# Patient Record
Sex: FEMALE | Race: WHITE | NOT HISPANIC OR LATINO | URBAN - METROPOLITAN AREA
[De-identification: names, ages, dates, MRNs, and addresses within clinical notes are randomized per-mention and may not be internally consistent; named-entity substitution may affect disease eponyms.]

---

## 2017-02-03 ENCOUNTER — OUTPATIENT (OUTPATIENT)
Dept: OUTPATIENT SERVICES | Facility: HOSPITAL | Age: 72
LOS: 1 days | End: 2017-02-03
Payer: MEDICARE

## 2017-02-03 VITALS
HEART RATE: 81 BPM | TEMPERATURE: 98 F | OXYGEN SATURATION: 95 % | HEIGHT: 60 IN | RESPIRATION RATE: 15 BRPM | DIASTOLIC BLOOD PRESSURE: 86 MMHG | WEIGHT: 265 LBS | SYSTOLIC BLOOD PRESSURE: 143 MMHG

## 2017-02-03 DIAGNOSIS — Z01.818 ENCOUNTER FOR OTHER PREPROCEDURAL EXAMINATION: ICD-10-CM

## 2017-02-03 DIAGNOSIS — M66.88 SPONTANEOUS RUPTURE OF OTHER TENDONS, OTHER SITES: ICD-10-CM

## 2017-02-03 DIAGNOSIS — Z90.89 ACQUIRED ABSENCE OF OTHER ORGANS: Chronic | ICD-10-CM

## 2017-02-03 DIAGNOSIS — R26.89 OTHER ABNORMALITIES OF GAIT AND MOBILITY: ICD-10-CM

## 2017-02-03 DIAGNOSIS — Z98.890 OTHER SPECIFIED POSTPROCEDURAL STATES: Chronic | ICD-10-CM

## 2017-02-03 DIAGNOSIS — Z84.89 FAMILY HISTORY OF OTHER SPECIFIED CONDITIONS: Chronic | ICD-10-CM

## 2017-02-03 DIAGNOSIS — Z99.81 DEPENDENCE ON SUPPLEMENTAL OXYGEN: ICD-10-CM

## 2017-02-03 DIAGNOSIS — S86.011A STRAIN OF RIGHT ACHILLES TENDON, INITIAL ENCOUNTER: ICD-10-CM

## 2017-02-03 DIAGNOSIS — Z90.710 ACQUIRED ABSENCE OF BOTH CERVIX AND UTERUS: Chronic | ICD-10-CM

## 2017-02-03 DIAGNOSIS — Z96.651 PRESENCE OF RIGHT ARTIFICIAL KNEE JOINT: Chronic | ICD-10-CM

## 2017-02-03 LAB
ALBUMIN SERPL ELPH-MCNC: 3.5 G/DL — SIGNIFICANT CHANGE UP (ref 3.3–5)
ALP SERPL-CCNC: 109 U/L — SIGNIFICANT CHANGE UP (ref 30–120)
ALT FLD-CCNC: 24 U/L DA — SIGNIFICANT CHANGE UP (ref 10–60)
ANION GAP SERPL CALC-SCNC: 6 MMOL/L — SIGNIFICANT CHANGE UP (ref 5–17)
AST SERPL-CCNC: 25 U/L — SIGNIFICANT CHANGE UP (ref 10–40)
BILIRUB SERPL-MCNC: 0.5 MG/DL — SIGNIFICANT CHANGE UP (ref 0.2–1.2)
BUN SERPL-MCNC: 21 MG/DL — SIGNIFICANT CHANGE UP (ref 7–23)
CALCIUM SERPL-MCNC: 10.2 MG/DL — SIGNIFICANT CHANGE UP (ref 8.4–10.5)
CHLORIDE SERPL-SCNC: 101 MMOL/L — SIGNIFICANT CHANGE UP (ref 96–108)
CO2 SERPL-SCNC: 35 MMOL/L — HIGH (ref 22–31)
CREAT SERPL-MCNC: 1.1 MG/DL — SIGNIFICANT CHANGE UP (ref 0.5–1.3)
GLUCOSE SERPL-MCNC: 165 MG/DL — HIGH (ref 70–99)
HCT VFR BLD CALC: 45.8 % — HIGH (ref 34.5–45)
HGB BLD-MCNC: 14.5 G/DL — SIGNIFICANT CHANGE UP (ref 11.5–15.5)
MCHC RBC-ENTMCNC: 27.4 PG — SIGNIFICANT CHANGE UP (ref 27–34)
MCHC RBC-ENTMCNC: 31.7 GM/DL — LOW (ref 32–36)
MCV RBC AUTO: 86.4 FL — SIGNIFICANT CHANGE UP (ref 80–100)
PLATELET # BLD AUTO: 265 K/UL — SIGNIFICANT CHANGE UP (ref 150–400)
POTASSIUM SERPL-MCNC: 3.9 MMOL/L — SIGNIFICANT CHANGE UP (ref 3.5–5.3)
POTASSIUM SERPL-SCNC: 3.9 MMOL/L — SIGNIFICANT CHANGE UP (ref 3.5–5.3)
PROT SERPL-MCNC: 7.7 G/DL — SIGNIFICANT CHANGE UP (ref 6–8.3)
RBC # BLD: 5.3 M/UL — HIGH (ref 3.8–5.2)
RBC # FLD: 19.6 % — HIGH (ref 10.3–14.5)
SODIUM SERPL-SCNC: 142 MMOL/L — SIGNIFICANT CHANGE UP (ref 135–145)
WBC # BLD: 13.7 K/UL — HIGH (ref 3.8–10.5)
WBC # FLD AUTO: 13.7 K/UL — HIGH (ref 3.8–10.5)

## 2017-02-03 PROCEDURE — 93010 ELECTROCARDIOGRAM REPORT: CPT | Mod: NC

## 2017-02-03 PROCEDURE — 93005 ELECTROCARDIOGRAM TRACING: CPT

## 2017-02-03 PROCEDURE — 80053 COMPREHEN METABOLIC PANEL: CPT

## 2017-02-03 PROCEDURE — 83036 HEMOGLOBIN GLYCOSYLATED A1C: CPT

## 2017-02-03 PROCEDURE — 85027 COMPLETE CBC AUTOMATED: CPT

## 2017-02-03 RX ORDER — METFORMIN HYDROCHLORIDE 850 MG/1
1 TABLET ORAL
Qty: 0 | Refills: 0 | COMMUNITY

## 2017-02-03 RX ORDER — ALLOPURINOL 300 MG
1 TABLET ORAL
Qty: 0 | Refills: 0 | COMMUNITY

## 2017-02-03 RX ORDER — IBUPROFEN 200 MG
1 TABLET ORAL
Qty: 0 | Refills: 0 | COMMUNITY

## 2017-02-03 RX ORDER — SITAGLIPTIN 50 MG/1
1 TABLET, FILM COATED ORAL
Qty: 0 | Refills: 0 | COMMUNITY

## 2017-02-03 RX ORDER — OMEPRAZOLE 10 MG/1
1 CAPSULE, DELAYED RELEASE ORAL
Qty: 0 | Refills: 0 | COMMUNITY

## 2017-02-03 RX ORDER — ATORVASTATIN CALCIUM 80 MG/1
1 TABLET, FILM COATED ORAL
Qty: 0 | Refills: 0 | COMMUNITY

## 2017-02-03 RX ORDER — METOPROLOL TARTRATE 50 MG
1 TABLET ORAL
Qty: 0 | Refills: 0 | COMMUNITY

## 2017-02-03 RX ORDER — ASPIRIN/CALCIUM CARB/MAGNESIUM 324 MG
1 TABLET ORAL
Qty: 0 | Refills: 0 | COMMUNITY

## 2017-02-03 RX ORDER — CHOLECALCIFEROL (VITAMIN D3) 125 MCG
1 CAPSULE ORAL
Qty: 0 | Refills: 0 | COMMUNITY

## 2017-02-03 NOTE — H&P PST ADULT - PMH
Achilles rupture, right    CAD (coronary artery disease)  nonobstructive  Dyslipidemia    Gait instability    GERD (gastroesophageal reflux disease)    Gout    History of cardiac catheterization  2016  History of CHF (congestive heart failure)    Inability to bear weight    Incontinence    Obesity, morbid, BMI 50 or higher    Oxygen dependent    Pulmonary hypertension  severe  Shortness of breath    Sleep apnea    Type 2 diabetes mellitus

## 2017-02-03 NOTE — H&P PST ADULT - FAMILY HISTORY
Father  Still living? Unknown  Family history of Alzheimer's disease, Age at diagnosis: Age Unknown     Mother  Still living? No  Family history of Alzheimer's disease, Age at diagnosis: Age Unknown

## 2017-02-03 NOTE — H&P PST ADULT - RS GEN PE MLT RESP DETAILS PC
no subcutaneous emphysema/no wheezes/airway patent/clear to auscultation bilaterally/breath sounds equal

## 2017-02-03 NOTE — H&P PST ADULT - PROBLEM SELECTOR PLAN 1
"Repair ruptured achilles tendon right foot" on 2/7/17.  Diagnostic testing will be forwarded for medical cleraance.  Pre op instructions were reviewed with patient and  and signed.  Anesthesia consult requested.

## 2017-02-03 NOTE — H&P PST ADULT - PSH
Family history of cholecystectomy  1/2017  History of hysterectomy  2004  History of knee replacement, total, right  2003  History of knee surgery  bilateral 1990's  History of tonsillectomy  childhood

## 2017-02-03 NOTE — H&P PST ADULT - NEGATIVE ENMT SYMPTOMS
no gum bleeding/no throat pain/no hearing difficulty/no recurrent cold sores/no tinnitus/no nasal discharge/no nose bleeds/no post-nasal discharge/no ear pain/no vertigo/no nasal congestion/no sinus symptoms/no dry mouth/no nasal obstruction/no abnormal taste sensation/no dysphagia

## 2017-02-03 NOTE — H&P PST ADULT - HISTORY OF PRESENT ILLNESS
70 yo female is scheduled for "repair ruptured achilles tendon right foot" on 2/7/17 with Mc Mendosa.  Patient is s/p fall 4 weeks ago diagnosed with right ruptured achilles tendon.    Patient non-weight bearing uses scooter and oxygen-dependant 24 hrs.  Right foot in boot for stability, only reports pain when not supported.

## 2017-02-03 NOTE — H&P PST ADULT - PROBLEM SELECTOR PLAN 3
unable to weight patient, states her weight was measured and recorded 1 month ago prior to her gallbladder surgery.

## 2017-02-04 LAB — HBA1C BLD-MCNC: 6.9 % — HIGH (ref 4–5.6)

## 2018-07-16 PROBLEM — I27.2 OTHER SECONDARY PULMONARY HYPERTENSION: Chronic | Status: ACTIVE | Noted: 2017-02-03
